# Patient Record
(demographics unavailable — no encounter records)

---

## 2025-03-20 NOTE — DISCUSSION/SUMMARY
[FreeTextEntry1] : 79-year-old woman with a history of memory difficulties, cognitive complaints, history of mood disorder, possible bipolar.  Admitted to [facility] end of last year with persistent vertigo, imaging showed no acute stroke, dissection of the left vertebral plan was to follow-up as outpatient. Has had no other new symptoms since then.  Evaluating lab work, she has a low serum B12. Plan: Will repeat MRA of the head and neck to follow-up the dissection. Advised to start supplementing B12 with the 1000 mcg daily p.o. Will order a repeat serum B12, homocystine and methylmalonic acid. If true B12 deficiency will recommend IM supplementation. Recommended further follow-up with psychiatry. Return to office, 4 to 6 months.

## 2025-03-20 NOTE — HISTORY OF PRESENT ILLNESS
[FreeTextEntry1] : 79-year-old woman with a history of mood disorder, possible bipolar, anxiety, here for follow-up visit, last time seen in the office September 2024.  She is accompanied by her daughter. 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  Follow-up MRI, MRA of brain and neck, showed again no acute stroke.  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 After the hospital he was she was transferred to rehab center because of persistent vertigo, unsteadiness.  Was there almost a month.  And even now she still feels at times unsteady symptoms dizzy, on and off throughout the days.  But much better.  Seems to worsen whenever she gets more active. She find herself more emotionally and cognitively scattered, not quite herself, her memory also is worse compared to in the past. Her daughter admits to this, find herself very scattered, she will tell her something and completely forget about it. Is one of the issues she has been having with memory and cognitive function dysfunction for several years, evaluations of including 2020, neuropsychological testing, that time the thought process was a predominantly mood disorder not a cognitive disorder. Reviewing her studies throughout hospitalization showed low B12 which has not been supplemented.

## 2025-03-20 NOTE — REVIEW OF SYSTEMS
[Memory Lapses or Loss] : memory loss [Decr. Concentrating Ability] : decreased concentrating ability [Repeating Questions] : repeated questioning about recent events [Dizziness] : dizziness [Frequent Falls] : not falling [As Noted in HPI] : as noted in HPI [Anxiety] : anxiety [Depression] : depression [Change In Personality] : personality change [Emotional Problems] : emotional problems [Negative] : Heme/Lymph

## 2025-03-20 NOTE — PHYSICAL EXAM
[General Appearance - Alert] : alert [General Appearance - In No Acute Distress] : in no acute distress [Total Score ___ / 30] : the patient achieved a score of [unfilled] /30 [Date / Time ___ / 5] : date / time [unfilled] / 5 [Place ___ / 5] : place [unfilled] / 5 [Registration ___ / 3] : registration [unfilled] / 3 [Serial Sevens ___/5] : serial sevens [unfilled] / 5 [Naming 2 Objects ___ / 2] : naming two objects [unfilled] / 2 [Repeating a Sentence ___ / 1] : repeating a sentence [unfilled] / 1 [Writing a Sentence ___ / 1] : write sentence [unfilled] / 1 [3-stage Verbal Command ___ / 3] : three-stage verbal command [unfilled] / 3 [Written Command ___ / 1] : written command [unfilled] / 1 [Copy a Design ___ / 1] : copy a design [unfilled] / 1 [Recall ___ / 3] : recall [unfilled] / 3 [Cranial Nerves Optic (II)] : visual acuity intact bilaterally,  visual fields full to confrontation, pupils equal round and reactive to light [Cranial Nerves Oculomotor (III)] : extraocular motion intact [Cranial Nerves Trigeminal (V)] : facial sensation intact symmetrically [Cranial Nerves Facial (VII)] : face symmetrical [Cranial Nerves Vestibulocochlear (VIII)] : hearing was intact bilaterally [Cranial Nerves Glossopharyngeal (IX)] : tongue and palate midline [Cranial Nerves Accessory (XI - Cranial And Spinal)] : head turning and shoulder shrug symmetric [Cranial Nerves Hypoglossal (XII)] : there was no tongue deviation with protrusion [Motor Tone] : muscle tone was normal in all four extremities [Motor Strength] : muscle strength was normal in all four extremities [Involuntary Movements] : no involuntary movements were seen [Motor Handedness Right-Handed] : the patient is right hand dominant [Sensation Tactile Decrease] : light touch was intact [Balance] : balance was intact [Limited Balance] : balance was intact [Past-pointing] : there was no past-pointing [Tremor] : no tremor present [Dysdiadochokinesia Bilaterally] : not present [Coordination - Dysmetria Impaired Finger-to-Nose Bilateral] : not present

## 2025-03-20 NOTE — DATA REVIEWED
[de-identified] :  RADIOLOGY & ADDITIONAL STUDIES:  MR head with and without contrast, MRA head and neck 10/17/24: MRI BRAIN: No MRI evidence of acute intracranial pathology.  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   MR ANGIOGRAPHY BRAIN: The proximal intradural vertebral arteries are extruded from the field-of-view. The area of diminished flow related opacification in the proximal intradural left vertebral artery on the prior CTA, is not adequately evaluated on the MRA examination. Follow-up postcontrast MRA with precontrast axial T1 fat saturated images through the posterior fossa is recommended.  Otherwise there is no major vessel occlusion or flow limiting stenosis about the [place].  There are mild, non flow-limiting stenoses in the distal V4 segment of the left vertebral artery and in the proximal P2 segment of the right posterior cerebral artery.  Redemonstration of an inferiorly directed, conically shaped outpouching arising from the supraclinoidsegment of the right internal carotid artery, with may represent an infundibular origin of a tiny, nonvisualized branch vessel vessel versus an aneurysm; in the sagittal plane, this measures approximately 3.5 mm across its neck and 2.9 mm from neck to dome. Follow up with neuro interventional radiology is recommended for risk assessment and long-term management.  MR ANGIOGRAPHY NECK: Axial T1 fat saturated images were not performed, which limits evaluation for dissection.  Thereare areas of poor flow related signal in the distal V2 and V3 segments of the left vertebral artery, suboptimally evaluated by noncontrast MRA technique. Follow-up postcontrast MRA with precontrast axial T1 fat saturated images through the neck are recommended to exclude the possibility of a dissection.  A stenosis in the right carotid bulb/proximal right internal carotid artery, which appeared moderate to severe on prior CTA appears mild on the MRA. Consider carotid duplex ultrasound for further evaluation.  There is flattening of the left carotid bulb without significant stenosis based on NASCET criteria.  A severe stenosis at the left vertebral artery origin and a tandem moderate stenosis in the distal left V1 segment are better visualized on prior CTA. .   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  This dissection appears to be separate from the moderate to severe stenosis of the left vertebral artery origin  [de-identified] : 			Age at Time of Test   	78 Years	 Test Item	Value	Message Flag	Units	Reference Range	Test Item Status	Comments	Sensitivities Vitamin B12, Serum	206	Low	pg/mL	232 - 1245	Final	 	     	  IMPRESSION:  Bilateral carotid artery plaque -Proximal right internal carotid artery with approximately 50% stenosis secondary to plaque. Follow-up carotid duplex ultrasound recommended in 6 months for reevaluation. -No hemodynamically significant left internal carotid artery stenosis.  High resistance antegrade waveform of the left vertebral artery reflects stenosis as identified on the recent CTA. Correlate clinically given the history of dizziness.